# Patient Record
Sex: FEMALE | Race: WHITE | NOT HISPANIC OR LATINO | Employment: PART TIME | URBAN - METROPOLITAN AREA
[De-identification: names, ages, dates, MRNs, and addresses within clinical notes are randomized per-mention and may not be internally consistent; named-entity substitution may affect disease eponyms.]

---

## 2017-12-18 ENCOUNTER — HOSPITAL ENCOUNTER (EMERGENCY)
Facility: HOSPITAL | Age: 47
Discharge: HOME | End: 2017-12-18
Attending: EMERGENCY MEDICINE | Admitting: EMERGENCY MEDICINE
Payer: COMMERCIAL

## 2017-12-18 ENCOUNTER — APPOINTMENT (EMERGENCY)
Dept: RADIOLOGY | Facility: HOSPITAL | Age: 47
End: 2017-12-18
Payer: COMMERCIAL

## 2017-12-18 ENCOUNTER — APPOINTMENT (EMERGENCY)
Dept: CT IMAGING | Facility: HOSPITAL | Age: 47
End: 2017-12-18
Payer: COMMERCIAL

## 2017-12-18 VITALS
SYSTOLIC BLOOD PRESSURE: 141 MMHG | OXYGEN SATURATION: 100 % | WEIGHT: 195 LBS | TEMPERATURE: 97.8 F | DIASTOLIC BLOOD PRESSURE: 61 MMHG | RESPIRATION RATE: 16 BRPM | HEART RATE: 87 BPM

## 2017-12-18 DIAGNOSIS — S13.4XXA WHIPLASH: ICD-10-CM

## 2017-12-18 DIAGNOSIS — S09.90XA CLOSED HEAD INJURY: Primary | ICD-10-CM

## 2017-12-18 PROCEDURE — 72040 X-RAY EXAM NECK SPINE 2-3 VW: CPT

## 2017-12-18 PROCEDURE — 70450 CT HEAD/BRAIN W/O DYE: CPT

## 2017-12-18 PROCEDURE — 99284 EMERGENCY DEPT VISIT MOD MDM: CPT

## 2017-12-18 RX ORDER — IBUPROFEN 600 MG/1
600 TABLET ORAL EVERY 6 HOURS PRN
Qty: 30 TABLET | Refills: 0 | Status: SHIPPED | OUTPATIENT
Start: 2017-12-18 | End: 2018-02-23 | Stop reason: ALTCHOICE

## 2017-12-18 RX ORDER — METHOCARBAMOL 500 MG/1
500 TABLET, FILM COATED ORAL 4 TIMES DAILY
Qty: 40 TABLET | Refills: 0 | Status: SHIPPED | OUTPATIENT
Start: 2017-12-18 | End: 2018-02-23 | Stop reason: ALTCHOICE
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
## 2018-02-23 ENCOUNTER — OFFICE VISIT (OUTPATIENT)
Dept: URGENT CARE | Age: 48
End: 2018-02-23

## 2018-02-23 VITALS
SYSTOLIC BLOOD PRESSURE: 130 MMHG | HEIGHT: 68 IN | RESPIRATION RATE: 20 BRPM | TEMPERATURE: 98.2 F | BODY MASS INDEX: 31.37 KG/M2 | WEIGHT: 207 LBS | HEART RATE: 85 BPM | OXYGEN SATURATION: 98 % | DIASTOLIC BLOOD PRESSURE: 80 MMHG

## 2018-02-23 DIAGNOSIS — Z02.1 PHYSICAL EXAM, PRE-EMPLOYMENT: Primary | ICD-10-CM

## 2018-02-23 RX ORDER — MULTIVITAMIN
1 CAPSULE ORAL DAILY
COMMUNITY

## 2018-02-23 RX ORDER — ASPIRIN 81 MG/1
81 TABLET, CHEWABLE ORAL DAILY
COMMUNITY

## 2018-02-23 NOTE — PROGRESS NOTES
[facility]        NAME: Juliana Rosa is a 50 y o  female  : 1970    MRN: [id number]  DATE: 2018  TIME: 5:43 PM    Assessment and Plan   No primary diagnosis found  No diagnosis found  Patient Instructions     There are no Patient Instructions on file for this visit  Chief Complaint     Chief Complaint   Patient presents with    Annual Exam         History of Present Illness   Juliana Rosa presents to the clinic c/o    This is a 50year old female here today for work physical   She denies any significant medical history  She has had dizziness in the past but this resolved  She had cyst removed from wrist and tonsillectomy 20 years ago  Review of Systems   Review of Systems   Constitutional: Negative  HENT: Negative  Respiratory: Negative  Cardiovascular: Negative  Gastrointestinal: Negative  Genitourinary: Negative  Musculoskeletal: Negative  Neurological: Negative  Psychiatric/Behavioral: Negative  Current Medications     Long-Term Prescriptions   Medication Sig Dispense Refill    aspirin 81 mg chewable tablet Chew 81 mg daily      Multiple Vitamin (MULTIVITAMIN) capsule Take 1 capsule by mouth daily         Current Allergies     Allergies as of 2018 - Reviewed 2018   Allergen Reaction Noted    Clindamycin Rash 2012            The following portions of the patient's history were reviewed and updated as appropriate: allergies, current medications, past family history, past medical history, past social history, past surgical history and problem list     Objective   /80   Pulse 85   Temp 98 2 °F (36 8 °C) (Temporal)   Resp 20   Ht 5' 8" (1 727 m)   Wt 93 9 kg (207 lb)   LMP 2018   SpO2 98%   BMI 31 47 kg/m²        Physical Exam     Physical Exam   Constitutional: She is oriented to person, place, and time  She appears well-developed and well-nourished  HENT:   Head: Normocephalic  Right Ear: External ear normal    Left Ear: External ear normal    Nose: Nose normal    Mouth/Throat: Oropharynx is clear and moist    Eyes: Pupils are equal, round, and reactive to light  Neck: Normal range of motion  Neck supple  Cardiovascular: Normal rate, regular rhythm and normal heart sounds  Pulmonary/Chest: Effort normal    Abdominal: Soft  Bowel sounds are normal    Musculoskeletal: Normal range of motion  Neurological: She is alert and oriented to person, place, and time  Skin: Skin is warm and dry  Psychiatric: She has a normal mood and affect   Her behavior is normal

## 2018-09-25 ENCOUNTER — TELEPHONE (OUTPATIENT)
Dept: FAMILY MEDICINE CLINIC | Facility: CLINIC | Age: 48
End: 2018-09-25

## 2021-01-22 ENCOUNTER — OFFICE VISIT (OUTPATIENT)
Dept: FAMILY MEDICINE CLINIC | Facility: CLINIC | Age: 51
End: 2021-01-22

## 2021-01-22 VITALS
SYSTOLIC BLOOD PRESSURE: 118 MMHG | HEART RATE: 94 BPM | OXYGEN SATURATION: 97 % | RESPIRATION RATE: 18 BRPM | BODY MASS INDEX: 33.1 KG/M2 | DIASTOLIC BLOOD PRESSURE: 78 MMHG | TEMPERATURE: 97.5 F | WEIGHT: 217.7 LBS

## 2021-01-22 DIAGNOSIS — Z71.84 TRAVEL ADVICE ENCOUNTER: Primary | ICD-10-CM

## 2021-01-22 DIAGNOSIS — U07.1 COVID-19 VIRUS INFECTION: ICD-10-CM

## 2021-01-22 PROCEDURE — 99213 OFFICE O/P EST LOW 20 MIN: CPT | Performed by: FAMILY MEDICINE

## 2021-01-22 NOTE — LETTER
January 22, 2021     Patient: Maria Brown   YOB: 1970   Date of Visit: 1/22/2021       To Whom it May Concern:    Maria Brown is under my professional care  She was seen in my office on 1/22/2021  She was tested positively for Covid19 on 11/25/2020  Repeated Covid test was negative 1/9/2021  She is asymptomatic currently  She does not need to get tested again per CDC recommendation  She has been cleared for travel  "CDC does not recommend getting tested again in the three months after a positive viral test, as long as you do not have symptoms of COVID-19  If you have had a positive viral test in the past 3 months, and you have met the criteria to end isolation, travel with a copy of your positive test result and a letter from your doctor or health department that states you have been cleared for travel " -www cdc gov    If you have any questions or concerns, please don't hesitate to call           Sincerely,          Himanshu Barker MD        CC: No Recipients

## 2021-01-22 NOTE — ASSESSMENT & PLAN NOTE
Patient is going to [facility] next week, 1/28/2021  Letter for traveling generated and printed for patient today, stating that she has been cleared for travel, no need to get tested again

## 2021-01-22 NOTE — PROGRESS NOTES
Assessment/Plan:    Problem List Items Addressed This Visit        Other    COVID-19 virus infection     Covid test positive 11/25/20  Repeat test negative 1/9/2021  Asymptomatic  No quarantine required  Travel advice encounter - Primary     Patient is going to [facility] next week, 1/28/2021  Letter for traveling generated and printed for patient today, stating that she has been cleared for travel, no need to get tested again  Disposition:     I have spent 20 minutes directly with the patient  Greater than 50% of this time was spent in counseling/coordination of care regarding: patient and family education  Encounter provider Diana Mendoza MD    Provider located at [facility] [address] [facility] [address]  [phone]    Recent Visits  No visits were found meeting these conditions  Showing recent visits within past 7 days and meeting all other requirements     Today's Visits  Date Type Provider Dept   01/22/21 Office Visit Diana Mendoza MD [facility] today's visits and meeting all other requirements     Future Appointments  No visits were found meeting these conditions  Showing future appointments within next 150 days and meeting all other requirements          Subjective:   Venita Rubi is a 44 y o  female who has been screened for COVID-19  Symptom change since last report: resolving  Patient is currently asymptomatic  Patient denies fever, chills, fatigue, malaise, congestion, rhinorrhea, sore throat, anosmia, loss of taste, cough, shortness of breath, chest tightness, abdominal pain, nausea, vomiting, diarrhea, myalgias and headaches  Matt Bustillos has been staying home and has isolated themselves in her home  She is taking care to not share personal items and is cleaning all surfaces that are touched often, like counters, tabletops, and doorknobs using household cleaning sprays or wipes   She is wearing a mask when she leaves her room  Date of positive COVID-19 PCR: 11/25/2020  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 [place]  She was tested positively for Covid19 11/25/2020, repeated test at work was negative 1/9/2021  Her father passed away last week, and she is going back to her home country, [place] [facility] next Thursday  She requests the letter for traveling  No results found for: Elza Alamo, [address] [facility], [address] [facility], [place] [address]  History reviewed  No pertinent past medical history  Past Surgical History:   Procedure Laterality Date    APPENDECTOMY      TONSILLECTOMY       Current Outpatient Medications   Medication Sig Dispense Refill    Ascorbic Acid (VITAMIN C PO) Take by mouth      aspirin 81 mg chewable tablet Chew 81 mg daily      Multiple Vitamin (MULTIVITAMIN) capsule Take 1 capsule by mouth daily      Multiple Vitamins-Minerals (ZINC PO) Take by mouth      Nutritional Supplements (VITAMIN D MAINTENANCE PO) Take by mouth       No current facility-administered medications for this visit  Allergies   Allergen Reactions    Clindamycin Rash       Review of Systems   Constitutional: Negative for chills, fatigue and fever  HENT: Negative for congestion, rhinorrhea and sore throat  Respiratory: Negative for cough, chest tightness and shortness of breath  Gastrointestinal: Negative for abdominal pain, diarrhea, nausea and vomiting  Musculoskeletal: Negative for myalgias  Neurological: Negative for headaches  Objective:    Vitals:    01/22/21 0926   BP: 118/78   BP Location: Left arm   Patient Position: Sitting   Cuff Size: Large   Pulse: 94   Resp: 18   Temp: 97 5 °F (36 4 °C)   TempSrc: Tympanic   SpO2: 97%   Weight: 98 7 kg (217 lb 11 2 oz)       Physical Exam  Vitals signs and nursing note reviewed  Constitutional:       General: She is not in acute distress  Appearance: She is well-developed  HENT:      Head: Normocephalic and atraumatic     Eyes:      General: Right eye: No discharge  Left eye: No discharge  Conjunctiva/sclera: Conjunctivae normal    Neck:      Musculoskeletal: Neck supple  Cardiovascular:      Rate and Rhythm: Normal rate and regular rhythm  Pulses: Normal pulses  Heart sounds: No murmur  Pulmonary:      Effort: Pulmonary effort is normal  No respiratory distress  Breath sounds: Normal breath sounds  Abdominal:      General: There is no distension  Palpations: Abdomen is soft  Tenderness: There is no abdominal tenderness  Musculoskeletal:      Right lower leg: No edema  Left lower leg: No edema  Skin:     General: Skin is warm and dry  Neurological:      General: No focal deficit present  Mental Status: She is alert and oriented to person, place, and time     Psychiatric:         Mood and Affect: Mood normal          Behavior: Behavior normal

## 2025-01-03 ENCOUNTER — APPOINTMENT (OUTPATIENT)
Dept: LAB | Facility: MEDICAL CENTER | Age: 55
End: 2025-01-03

## 2025-01-03 ENCOUNTER — OCCMED (OUTPATIENT)
Dept: URGENT CARE | Facility: MEDICAL CENTER | Age: 55
End: 2025-01-03

## 2025-01-03 ENCOUNTER — APPOINTMENT (OUTPATIENT)
Dept: PHYSICAL THERAPY | Facility: MEDICAL CENTER | Age: 55
End: 2025-01-03

## 2025-01-03 DIAGNOSIS — Z02.1 ENCOUNTER FOR PRE-EMPLOYMENT HEALTH SCREENING EXAMINATION: ICD-10-CM

## 2025-01-03 DIAGNOSIS — Z02.1 ENCOUNTER FOR PRE-EMPLOYMENT HEALTH SCREENING EXAMINATION: Primary | ICD-10-CM

## 2025-01-03 PROCEDURE — 97530 THERAPEUTIC ACTIVITIES: CPT | Performed by: PHYSICAL MEDICINE & REHABILITATION

## 2025-01-03 PROCEDURE — 36415 COLL VENOUS BLD VENIPUNCTURE: CPT

## 2025-01-03 PROCEDURE — 86480 TB TEST CELL IMMUN MEASURE: CPT

## 2025-01-04 LAB
GAMMA INTERFERON BACKGROUND BLD IA-ACNC: 0.33 IU/ML
M TB IFN-G BLD-IMP: NEGATIVE
M TB IFN-G CD4+ BCKGRND COR BLD-ACNC: -0.01 IU/ML
M TB IFN-G CD4+ BCKGRND COR BLD-ACNC: -0.05 IU/ML
MITOGEN IGNF BCKGRD COR BLD-ACNC: 4.45 IU/ML